# Patient Record
Sex: FEMALE | HISPANIC OR LATINO | URBAN - METROPOLITAN AREA
[De-identification: names, ages, dates, MRNs, and addresses within clinical notes are randomized per-mention and may not be internally consistent; named-entity substitution may affect disease eponyms.]

---
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
## 2025-04-02 ENCOUNTER — TELEPHONE (OUTPATIENT)
Dept: FAMILY MEDICINE | Age: 20
End: 2025-04-02

## 2025-07-28 ENCOUNTER — TELEPHONE (OUTPATIENT)
Dept: FAMILY MEDICINE | Age: 20
End: 2025-07-28

## (undated) NOTE — LETTER
Date & Time: 9/8/2022, 2:10 PM  Patient: Dillon Herring  Encounter Provider(s):    On File, VICKEY TURNER Attending  MD Zahida Edward PA-C       To Whom It May Concern:    Therese Cintron was seen and treated in our department on 9/8/2022. She should not participate in gym/sports until 9/15/22 and can return to school 9/9/22.     If you have any questions or concerns, please do not hesitate to call.        _____________________________  Physician/[facility] Signature